# Patient Record
(demographics unavailable — no encounter records)

---

## 2025-03-20 NOTE — PHYSICAL EXAM
[Well-appearing] : well-appearing [Normocephalic] : normocephalic [No dysmorphic facial features] : no dysmorphic facial features [No abnormal neurocutaneous stigmata or skin lesions] : no abnormal neurocutaneous stigmata or skin lesions [Straight] : straight [No jazzy or dimples] : no jazzy or dimples [No deformities] : no deformities [Alert] : alert [Well related, good eye contact] : well related, good eye contact [Conversant] : conversant [Normal speech and language] : normal speech and language [Follows instructions well] : follows instructions well [VFF] : VFF [Pupils reactive to light and accommodation] : pupils reactive to light and accommodation [Full extraocular movements] : full extraocular movements [No nystagmus] : no nystagmus [No papilledema] : no papilledema [Normal facial sensation to light touch] : normal facial sensation to light touch [No facial asymmetry or weakness] : no facial asymmetry or weakness [Gross hearing intact] : gross hearing intact [Equal palate elevation] : equal palate elevation [Good shoulder shrug] : good shoulder shrug [Normal tongue movement] : normal tongue movement [Midline tongue, no fasciculations] : midline tongue, no fasciculations [Normal axial and appendicular muscle tone] : normal axial and appendicular muscle tone [Gets up on table without difficulty] : gets up on table without difficulty [No pronator drift] : no pronator drift [Normal finger tapping and fine finger movements] : normal finger tapping and fine finger movements [No abnormal involuntary movements] : no abnormal involuntary movements [5/5 strength in proximal and distal muscles of arms and legs] : 5/5 strength in proximal and distal muscles of arms and legs [Walks and runs well] : walks and runs well [Able to do deep knee bend] : able to do deep knee bend [Able to walk on heels] : able to walk on heels [Able to walk on toes] : able to walk on toes [Localizes LT and temperature] : localizes LT and temperature [No dysmetria on FTNT] : no dysmetria on FTNT [Good walking balance] : good walking balance [Normal gait] : normal gait [Able to tandem well] : able to tandem well [Negative Romberg] : negative Romberg

## 2025-03-21 NOTE — CONSULT LETTER
[FreeTextEntry1] : Dear Dr. ARLENE CHEEK ,  I had the pleasure of seeing  ALVARO CURTIS for follow up today.  Below is my note regarding the office visit today.  Thank you so very much for allowing me to participate in ALVARO's  care.  Please don't hesitate to call me should any questions or issues arise .  Sincerely,   Yasir Tinoco MD, FACS, FSPU Chief, Pediatric Urology Professor of Urology and Pediatrics [facility]  President, American U[employer] Association - [place] Section Past-President, Societies for Pediatric Urology

## 2025-03-21 NOTE — CONSULT LETTER
[FreeTextEntry1] : Dear Dr. ARLENE CHEEK ,  I had the pleasure of seeing  ALVARO CURTIS for follow up today.  Below is my note regarding the office visit today.  Thank you so very much for allowing me to participate in ALVARO's  care.  Please don't hesitate to call me should any questions or issues arise .  Sincerely,   Yasir Tinoco MD, FACS, FSPU Chief, Pediatric Urology Professor of Urology and Pediatrics [facility]  President, American Urological Association - [place] Section Past-President, Societies for Pediatric Urology

## 2025-03-21 NOTE — CONSULT LETTER
[FreeTextEntry1] : Dear Dr. ARLENE CHEEK ,  I had the pleasure of seeing  ALVARO CURTIS for follow up today.  Below is my note regarding the office visit today.  Thank you so very much for allowing me to participate in ALVARO's  care.  Please don't hesitate to call me should any questions or issues arise .  Sincerely,   Yasir Tinoco MD, FACS, FSPU Chief, Pediatric Urology Professor of Urology and Pediatrics [facility] of [facility]  President, American Urological Association - [place] Section Past-President, Societies for Pediatric Urology

## 2025-03-21 NOTE — ASSESSMENT
[FreeTextEntry1] : ALVARO is status post bilateral reimplantation 7/2024 with an unremarkable sonogram at today's visit.  At this time, no further imaging studies is needed unless a UTI or other pertinent clinical situation were to develop. I recommended yearly UA and blood pressure check through adolescence in the primary care setting. This information was communicated to the family and all questions were answered. He will return here as needed.

## 2025-03-21 NOTE — HISTORY OF PRESENT ILLNESS
[TextBox_4] : ALVARO presents today for a follow up visit.  Status post bilateral reimplantation 7/2024.  Status post cystoscopy 9/2024 which demonstrated smooth bladder with new tunnels noted and orifices in excellent location and patent.  He returns today for repeat renal/bladder ultrasounds.   Since the last visit, he has been well without any UTIs, unexplained fevers, voiding complaints, issues feeding.

## 2025-03-21 NOTE — CONSULT LETTER
[FreeTextEntry1] : Dear Dr. ARLENE CHEEK ,  I had the pleasure of seeing  ALVARO CURTIS for follow up today.  Below is my note regarding the office visit today.  Thank you so very much for allowing me to participate in ALVARO's  care.  Please don't hesitate to call me should any questions or issues arise .  Sincerely,   Yasir Tinoco MD, FACS, FSPU Chief, Pediatric Urology Professor of Urology and Pediatrics [facility]  President, American Urological Association - [place] Section Past-President, Societies for Pediatric Urology

## 2025-03-21 NOTE — CONSULT LETTER
[FreeTextEntry1] : Dear Dr. ARLENE CHEEK ,  I had the pleasure of seeing  ALVARO CURTIS for follow up today.  Below is my note regarding the office visit today.  Thank you so very much for allowing me to participate in ALVARO's  care.  Please don't hesitate to call me should any questions or issues arise .  Sincerely,   Yasir Tinoco MD, FACS, FSPU Chief, Pediatric Urology Professor of Urology and Pediatrics [facility] of [facility]  President, American Urological Association - [place] Section Past-President, Societies for Pediatric Urology

## 2025-03-26 NOTE — PLAN
[FreeTextEntry1] : - [place] forms to be completed by parent and teacher and sent back - Discussed use of medications as well as potential side effects - Follow up 1 month

## 2025-03-26 NOTE — PLAN
[FreeTextEntry1] : - [place] forms to be completed by parent and teacher and sent back - Discussed use of medications as well as potential side effects - Follow up 1 month

## 2025-03-26 NOTE — HISTORY OF PRESENT ILLNESS
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  Alvaro has been having a hard time with his behavior. He is very hyperactive and he cannot focus. He is oftentimes out of his seat and requires constant redirection. He has an IEP and receives PT, OT, ST. He also sees the school counselor. He is in a self contained 8:1:1 classroom. At home mother says he displays the same behavior. He is in weekly private therapy. He has a difficult time with regulating his emotions. Denies any concern for seizure activity.   HPI (April 2024):  Alvaro's brother was diagnosed with absence epilepsy in December and started on ETX. After genetic testing was done on the family, Alvaro's results were positive for the same variant as his brother- SCN8A.   Developmental delays both with motor and speech present since he was a baby. Has received EI and currently gets ST, PT, and OT. There is also concern for hyperactive behaviors.   Denies staring, twitching, seizure or seizure-like activity. No serious head injury, meningoencephalitis.

## 2025-03-26 NOTE — CONSULT LETTER
[Dear  ___] : Dear  [unfilled], [Consult Letter:] : I had the pleasure of evaluating your patient, [unfilled]. [Please see my note below.] : Please see my note below. [Consult Closing:] : Thank you very much for allowing me to participate in the care of this patient.  If you have any questions, please do not hesitate to contact me. [Sincerely,] : Sincerely, [FreeTextEntry3] : Nancy Linda, LOBITO-BC Board Certified Family Nurse Practitioner Pediatric Neurology [facility] [address] [place] [address] Tel: [phone] Fax: [phone]

## 2025-03-26 NOTE — QUALITY MEASURES
[Seizure frequency] : Seizure frequency: Yes [Etiology, seizure type, and epilepsy syndrome] : Etiology, seizure type, and epilepsy syndrome: Yes [Side effects of anti-seizure medications] : Side effects of anti-seizure medications: Yes [Safety and education around seizures] : Safety and education around seizures: Yes [Sudden unexpected death in epilepsy (SUDEP)] : Sudden unexpected death in epilepsy: Yes [Treatment-resistant epilepsy (every visit)] : Treatment-resistant epilepsy (every visit): Yes [Adherence to medication(s)] : Adherence to medication(s): Yes [Options for adjunctive therapy (Neurostimulation, CBD, Dietary Therapy, Epilepsy Surgery)] : Options for adjunctive therapy (Neurostimulation, CBD, Dietary Therapy, Epilepsy Surgery): Yes [25 Hydroxy Vitamin D level assessed and Vitamin D3 ordered] : 25 Hydroxy Vitamin D level assessed and Vitamin D3 ordered: Yes [Thyroid profile ordered] : Thyroid profile ordered: Yes [Issues around driving] : Issues around driving: Not Applicable [Screening for anxiety, depression] : Screening for anxiety, depression: Not Applicable [Counseling for women of childbearing potential with epilepsy (including folic acid supplement)] : Counseling for women of childbearing potential with epilepsy (including folic acid supplement): Not Applicable

## 2025-03-26 NOTE — ASSESSMENT
[FreeTextEntry1] :  ALVARO is a 5 year old with SCN8A variant here with mother with concerns for ADHD. Currently in a self contained 8:1:1 classroom with an IEP And receives PT, OT, ST, and counseling. Denies any concern for seizure like activity. Previous rEEG normal. Will evaluate for ADHD using [place] forms. Consider starting Quillivant at next visit based on results of evaluation.

## 2025-03-26 NOTE — BIRTH HISTORY
[At Term] : at term [place] [Normal Vaginal Route] : by normal vaginal route [None] : there were no delivery complications [Speech & Motor Delay] : patient has speech and motor delay  [Physical Therapy] : physical therapy [Occupational Therapy] : occupational therapy [Speech Therapy] : speech therapy [Age Appropriate] : age appropriate developmental milestones not met [FreeTextEntry5] : EI since 18 mo

## 2025-03-26 NOTE — ASSESSMENT
[FreeTextEntry1] :  ALVARO is a 5 year old with SCN8A variant here with mother with concerns for ADHD. Currently in a self contained 8:1:1 classroom with an IEP And receives PT, OT, ST, and counseling. Denies any concern for seizure like activity. Previous rEEG normal. Will evaluate for ADHD using [place] forms. Consider starting Quillivant at next visit based on results of evaluation.

## 2025-03-26 NOTE — CONSULT LETTER
[Dear  ___] : Dear  [unfilled], [Consult Letter:] : I had the pleasure of evaluating your patient, [unfilled]. [Please see my note below.] : Please see my note below. [Consult Closing:] : Thank you very much for allowing me to participate in the care of this patient.  If you have any questions, please do not hesitate to contact me. [Sincerely,] : Sincerely, [FreeTextEntry3] : Nancy Linda, LOBITO-BC Board Certified Family Nurse Practitioner Pediatric Neurology [facility] [address] [place] [address] Tel: [phone] Fax: [phone]

## 2025-04-16 NOTE — PLAN
[FreeTextEntry1] : - Quillivant 2 mL daily - Discussed use of medication as well as potential side effects - Follow up 1 month

## 2025-04-16 NOTE — ASSESSMENT
[FreeTextEntry1] :  ALVARO is a 5 year old with SCN8A variant here with mother for a follow up for ADHD. Currently in a self contained 8:1:1 classroom with an IEP And receives PT, OT, ST, and counseling. 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 Letter given for school. Will start stimulant.

## 2025-04-16 NOTE — REASON FOR VISIT
[Home] : at home, [unfilled] , at the time of the visit. [Medical Office: ([facility])___] : at the medical office located in  [Telehealth (audio & video)] : This visit was provided via telehealth using real-time 2-way audio visual technology. [Follow-Up Evaluation] : a follow-up evaluation for [Seizure] : seizure [Mother] : mother [Medical Records] : medical records [FreeTextEntry3] : mother

## 2025-04-16 NOTE — HISTORY OF PRESENT ILLNESS
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  [place] questionnaires were completed by parent.    Parents responses:  Inattention 8/9   Hyperactivity 4/9  ODD: 5/8  Conduct disorder: 0/14  Anxiety/ Depression: 2/7   + ADHD combined type Performance questions: Parents: Areas of concern include reading, writing, and math.   HPI (April 2024):  Alvaro's brother was diagnosed with absence epilepsy in December and started on ETX. After genetic testing was done on the family, Alvaro's results were positive for the same variant as his brother- SCN8A.   Developmental delays both with motor and speech present since he was a baby. Has received EI and currently gets ST, PT, and OT. There is also concern for hyperactive behaviors.   Denies staring, twitching, seizure or seizure-like activity. No serious head injury, meningoencephalitis.

## 2025-04-16 NOTE — HISTORY OF PRESENT ILLNESS
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  [place] questionnaires were completed by parent.    Parents responses:  Inattention 8/9   Hyperactivity 4/9  ODD: 5/8  Conduct disorder: 0/14  Anxiety/ Depression: 2/7   + ADHD combined type Performance questions: Parents: Areas of concern include reading, writing, and math.   HPI (April 2024):  Alvaro's brother was diagnosed with absence epilepsy in December and started on ETX. After genetic testing was done on the family, Alvaro's results were positive for the same variant as his brother- SCN8A.   Developmental delays both with motor and speech present since he was a baby. Has received EI and currently gets ST, PT, and OT. There is also concern for hyperactive behaviors.   Denies staring, twitching, seizure or seizure-like activity. No serious head injury, meningoencephalitis.

## 2025-04-16 NOTE — CONSULT LETTER
[Dear  ___] : Dear  [unfilled], [Consult Letter:] : I had the pleasure of evaluating your patient, [unfilled]. [Please see my note below.] : Please see my note below. [Consult Closing:] : Thank you very much for allowing me to participate in the care of this patient.  If you have any questions, please do not hesitate to contact me. [Sincerely,] : Sincerely, [FreeTextEntry3] : Nancy Linda, LOBITO-BC Board Certified Family Nurse Practitioner Pediatric Neurology [facility] [address] [place] [address] Tel: [phone] Fax: [phone]

## 2025-04-16 NOTE — REASON FOR VISIT
[Home] : at home, [unfilled] , at the time of the visit. [Medical Office: ([facility])___] : at the medical office located in  [Telehealth (audio & video)] : This visit was provided via telehealth using real-time 2-way audio visual technology. [Follow-Up Evaluation] : a follow-up evaluation for [Seizure] : seizure [Mother] : mother [Medical Records] : medical records [FreeTextEntry3] : mother

## 2025-04-16 NOTE — ASSESSMENT
[FreeTextEntry1] :  ALVARO is a 5 year old with SCN8A variant here with mother for a follow up for ADHD. Currently in a self contained 8:1:1 classroom with an IEP And receives PT, OT, ST, and counseling. 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 Letter given for school. Will start stimulant.

## 2025-04-16 NOTE — CONSULT LETTER
[Dear  ___] : Dear  [unfilled], [Consult Letter:] : I had the pleasure of evaluating your patient, [unfilled]. [Please see my note below.] : Please see my note below. [Consult Closing:] : Thank you very much for allowing me to participate in the care of this patient.  If you have any questions, please do not hesitate to contact me. [Sincerely,] : Sincerely, [FreeTextEntry3] : Nancy Linda, LOBITO-BC Board Certified Family Nurse Practitioner Pediatric Neurology [facility] [address] [place] [address] Tel: [phone] Fax: [phone]